# Patient Record
Sex: MALE | Race: WHITE | Employment: FULL TIME | URBAN - METROPOLITAN AREA
[De-identification: names, ages, dates, MRNs, and addresses within clinical notes are randomized per-mention and may not be internally consistent; named-entity substitution may affect disease eponyms.]

---

## 2019-06-06 ENCOUNTER — ANESTHESIA EVENT (OUTPATIENT)
Dept: SURGERY | Age: 54
End: 2019-06-06
Payer: OTHER MISCELLANEOUS

## 2019-06-07 ENCOUNTER — ANESTHESIA (OUTPATIENT)
Dept: SURGERY | Age: 54
End: 2019-06-07
Payer: OTHER MISCELLANEOUS

## 2019-06-07 ENCOUNTER — HOSPITAL ENCOUNTER (OUTPATIENT)
Age: 54
Setting detail: OUTPATIENT SURGERY
Discharge: HOME | End: 2019-06-07
Attending: ORTHOPAEDIC SURGERY | Admitting: ORTHOPAEDIC SURGERY
Payer: OTHER MISCELLANEOUS

## 2019-06-07 VITALS
BODY MASS INDEX: 32.02 KG/M2 | OXYGEN SATURATION: 96 % | WEIGHT: 270 LBS | SYSTOLIC BLOOD PRESSURE: 145 MMHG | TEMPERATURE: 97.8 F | HEART RATE: 59 BPM | DIASTOLIC BLOOD PRESSURE: 75 MMHG | RESPIRATION RATE: 15 BRPM

## 2019-06-07 DIAGNOSIS — L76.82 PAIN AT SURGICAL INCISION: Primary | ICD-10-CM

## 2019-06-07 LAB — POTASSIUM BLD-SCNC: 3.5 MMOL/L (ref 3.5–5.1)

## 2019-06-07 PROCEDURE — 76060000032 HC ANESTHESIA 0.5 TO 1 HR: Performed by: ORTHOPAEDIC SURGERY

## 2019-06-07 PROCEDURE — 76210000020 HC REC RM PH II FIRST 0.5 HR: Performed by: ORTHOPAEDIC SURGERY

## 2019-06-07 PROCEDURE — 77030018836 HC SOL IRR NACL ICUM -A: Performed by: ORTHOPAEDIC SURGERY

## 2019-06-07 PROCEDURE — 77030022036 HC BLD SHV TOMCAT STRY -B: Performed by: ORTHOPAEDIC SURGERY

## 2019-06-07 PROCEDURE — 77030003666 HC NDL SPINAL BD -A: Performed by: ORTHOPAEDIC SURGERY

## 2019-06-07 PROCEDURE — 77030010509 HC AIRWY LMA MSK TELE -A: Performed by: ANESTHESIOLOGY

## 2019-06-07 PROCEDURE — 74011250636 HC RX REV CODE- 250/636

## 2019-06-07 PROCEDURE — 76010000138 HC OR TIME 0.5 TO 1 HR: Performed by: ORTHOPAEDIC SURGERY

## 2019-06-07 PROCEDURE — 77030000032 HC CUF TRNQT ZIMM -B: Performed by: ORTHOPAEDIC SURGERY

## 2019-06-07 PROCEDURE — 74011250636 HC RX REV CODE- 250/636: Performed by: ANESTHESIOLOGY

## 2019-06-07 PROCEDURE — 77030038012 HC WND COBLATN S&N -F: Performed by: ORTHOPAEDIC SURGERY

## 2019-06-07 PROCEDURE — 84132 ASSAY OF SERUM POTASSIUM: CPT

## 2019-06-07 PROCEDURE — 76210000006 HC OR PH I REC 0.5 TO 1 HR: Performed by: ORTHOPAEDIC SURGERY

## 2019-06-07 PROCEDURE — 74011250636 HC RX REV CODE- 250/636: Performed by: ORTHOPAEDIC SURGERY

## 2019-06-07 PROCEDURE — 74011250637 HC RX REV CODE- 250/637: Performed by: ANESTHESIOLOGY

## 2019-06-07 RX ORDER — FENTANYL CITRATE 50 UG/ML
INJECTION, SOLUTION INTRAMUSCULAR; INTRAVENOUS AS NEEDED
Status: DISCONTINUED | OUTPATIENT
Start: 2019-06-07 | End: 2019-06-07 | Stop reason: HOSPADM

## 2019-06-07 RX ORDER — HYDROMORPHONE HYDROCHLORIDE 2 MG/ML
0.5 INJECTION, SOLUTION INTRAMUSCULAR; INTRAVENOUS; SUBCUTANEOUS
Status: DISCONTINUED | OUTPATIENT
Start: 2019-06-07 | End: 2019-06-07 | Stop reason: HOSPADM

## 2019-06-07 RX ORDER — SODIUM CHLORIDE, SODIUM LACTATE, POTASSIUM CHLORIDE, CALCIUM CHLORIDE 600; 310; 30; 20 MG/100ML; MG/100ML; MG/100ML; MG/100ML
75 INJECTION, SOLUTION INTRAVENOUS CONTINUOUS
Status: DISCONTINUED | OUTPATIENT
Start: 2019-06-07 | End: 2019-06-07 | Stop reason: HOSPADM

## 2019-06-07 RX ORDER — LIDOCAINE HYDROCHLORIDE 10 MG/ML
0.1 INJECTION INFILTRATION; PERINEURAL AS NEEDED
Status: DISCONTINUED | OUTPATIENT
Start: 2019-06-07 | End: 2019-06-07 | Stop reason: HOSPADM

## 2019-06-07 RX ORDER — DEXAMETHASONE SODIUM PHOSPHATE 100 MG/10ML
INJECTION INTRAMUSCULAR; INTRAVENOUS AS NEEDED
Status: DISCONTINUED | OUTPATIENT
Start: 2019-06-07 | End: 2019-06-07 | Stop reason: HOSPADM

## 2019-06-07 RX ORDER — KETOROLAC TROMETHAMINE 30 MG/ML
30 INJECTION, SOLUTION INTRAMUSCULAR; INTRAVENOUS ONCE
Status: COMPLETED | OUTPATIENT
Start: 2019-06-07 | End: 2019-06-07

## 2019-06-07 RX ORDER — ACETAMINOPHEN 10 MG/ML
1000 INJECTION, SOLUTION INTRAVENOUS ONCE
Status: COMPLETED | OUTPATIENT
Start: 2019-06-07 | End: 2019-06-07

## 2019-06-07 RX ORDER — LIDOCAINE HYDROCHLORIDE 20 MG/ML
INJECTION, SOLUTION EPIDURAL; INFILTRATION; INTRACAUDAL; PERINEURAL AS NEEDED
Status: DISCONTINUED | OUTPATIENT
Start: 2019-06-07 | End: 2019-06-07 | Stop reason: HOSPADM

## 2019-06-07 RX ORDER — HYDROCODONE BITARTRATE AND ACETAMINOPHEN 5; 325 MG/1; MG/1
1 TABLET ORAL
Qty: 20 TAB | Refills: 0 | Status: SHIPPED | OUTPATIENT
Start: 2019-06-07 | End: 2019-06-10

## 2019-06-07 RX ORDER — SODIUM CHLORIDE 0.9 % (FLUSH) 0.9 %
5-40 SYRINGE (ML) INJECTION EVERY 8 HOURS
Status: DISCONTINUED | OUTPATIENT
Start: 2019-06-07 | End: 2019-06-07 | Stop reason: HOSPADM

## 2019-06-07 RX ORDER — OXYCODONE AND ACETAMINOPHEN 5; 325 MG/1; MG/1
1 TABLET ORAL AS NEEDED
Status: DISCONTINUED | OUTPATIENT
Start: 2019-06-07 | End: 2019-06-07 | Stop reason: HOSPADM

## 2019-06-07 RX ORDER — NALOXONE HYDROCHLORIDE 0.4 MG/ML
0.2 INJECTION, SOLUTION INTRAMUSCULAR; INTRAVENOUS; SUBCUTANEOUS AS NEEDED
Status: DISCONTINUED | OUTPATIENT
Start: 2019-06-07 | End: 2019-06-07 | Stop reason: HOSPADM

## 2019-06-07 RX ORDER — ROPIVACAINE HYDROCHLORIDE 5 MG/ML
INJECTION, SOLUTION EPIDURAL; INFILTRATION; PERINEURAL AS NEEDED
Status: DISCONTINUED | OUTPATIENT
Start: 2019-06-07 | End: 2019-06-07 | Stop reason: HOSPADM

## 2019-06-07 RX ORDER — PROPOFOL 10 MG/ML
INJECTION, EMULSION INTRAVENOUS AS NEEDED
Status: DISCONTINUED | OUTPATIENT
Start: 2019-06-07 | End: 2019-06-07 | Stop reason: HOSPADM

## 2019-06-07 RX ORDER — MIDAZOLAM HYDROCHLORIDE 1 MG/ML
2 INJECTION, SOLUTION INTRAMUSCULAR; INTRAVENOUS
Status: DISCONTINUED | OUTPATIENT
Start: 2019-06-07 | End: 2019-06-07 | Stop reason: HOSPADM

## 2019-06-07 RX ORDER — SODIUM CHLORIDE 0.9 % (FLUSH) 0.9 %
5-40 SYRINGE (ML) INJECTION AS NEEDED
Status: DISCONTINUED | OUTPATIENT
Start: 2019-06-07 | End: 2019-06-07 | Stop reason: HOSPADM

## 2019-06-07 RX ADMIN — PROPOFOL 200 MG: 10 INJECTION, EMULSION INTRAVENOUS at 09:56

## 2019-06-07 RX ADMIN — PROPOFOL 40 MG: 10 INJECTION, EMULSION INTRAVENOUS at 10:26

## 2019-06-07 RX ADMIN — DEXAMETHASONE SODIUM PHOSPHATE 10 MG: 100 INJECTION INTRAMUSCULAR; INTRAVENOUS at 10:24

## 2019-06-07 RX ADMIN — KETOROLAC TROMETHAMINE 30 MG: 30 INJECTION, SOLUTION INTRAMUSCULAR at 11:00

## 2019-06-07 RX ADMIN — SODIUM CHLORIDE, SODIUM LACTATE, POTASSIUM CHLORIDE, AND CALCIUM CHLORIDE 75 ML/HR: 600; 310; 30; 20 INJECTION, SOLUTION INTRAVENOUS at 09:06

## 2019-06-07 RX ADMIN — HYDROMORPHONE HYDROCHLORIDE 0.5 MG: 2 INJECTION INTRAMUSCULAR; INTRAVENOUS; SUBCUTANEOUS at 10:52

## 2019-06-07 RX ADMIN — FENTANYL CITRATE 100 MCG: 50 INJECTION, SOLUTION INTRAMUSCULAR; INTRAVENOUS at 09:56

## 2019-06-07 RX ADMIN — PROPOFOL 100 MG: 10 INJECTION, EMULSION INTRAVENOUS at 09:57

## 2019-06-07 RX ADMIN — HYDROMORPHONE HYDROCHLORIDE 0.5 MG: 2 INJECTION INTRAMUSCULAR; INTRAVENOUS; SUBCUTANEOUS at 10:57

## 2019-06-07 RX ADMIN — OXYCODONE HYDROCHLORIDE AND ACETAMINOPHEN 1 TABLET: 5; 325 TABLET ORAL at 11:14

## 2019-06-07 RX ADMIN — HYDROMORPHONE HYDROCHLORIDE 0.5 MG: 2 INJECTION INTRAMUSCULAR; INTRAVENOUS; SUBCUTANEOUS at 11:05

## 2019-06-07 RX ADMIN — LIDOCAINE HYDROCHLORIDE 90 MG: 20 INJECTION, SOLUTION EPIDURAL; INFILTRATION; INTRACAUDAL; PERINEURAL at 09:56

## 2019-06-07 RX ADMIN — ACETAMINOPHEN 1000 MG: 10 INJECTION, SOLUTION INTRAVENOUS at 11:01

## 2019-06-07 NOTE — BRIEF OP NOTE
BRIEF OPERATIVE NOTE    Date of Procedure: 6/7/2019   Preoperative Diagnosis: Complex tear of medial meniscus of left knee as current injury, initial encounter [S83.232A]  Postoperative Diagnosis: Complex tear of medial meniscus of left knee as current injury, initial encounter [S83.232A]    Procedure(s):  KNEE ARTHROSCOPY MEDIAL MENISCECTOMY/ LEFT  Surgeon(s) and Role:     * Whit Pollock MD - Primary         Surgical Assistant:     Surgical Staff:  Circ-1: Garett Martínez RN  Scrub Tech-1: Tamara Reis  Event Time In Time Out   Incision Start 1006    Incision Close 1028      Anesthesia: General   Estimated Blood Loss:   Specimens: * No specimens in log *   Findings:    Complications:   Implants: * No implants in log *

## 2019-06-07 NOTE — DISCHARGE INSTRUCTIONS
Post-Operative Instructions   For  Hermann Frey  Knee Arthroscopy  Phone:  [phone]    1. Unless otherwise instructed, you may place as much weight on your leg as you wish. 2. For the first 48-72 hours, following surgery, use ice on the knee every two hours (while awake) for 20-30 minutes at a time to help prevent swelling and lessen pain. Elevate the leg. 3. Perform at least 30 to 50 leg-raising exercises twice a day. These are in the arthroscopy booklet that you received. Begin exercise as soon as pain allows. Also perform gentle active motion of the knee as soon as pain allows. 4. On the second  day after surgery, remove the dressing. Leave steri-strips on, they eventually will peel off. Use waterproof band aids to cover when you shower. 5. Use  pain medication as instructed on the bottle. If you already have pain medication from another physician do not use it with this medication. You can also take ibuprofen for pain with this medication if you are not already on an arthritis type medication. 6. You may have some side effects from your pain medication. If you have nausea, try taking your medication with food. For itching, you may take over the counter Benadryl. 7. You may shower after two days. Remove the dressing and use waterproof band aids from the drugstore. 8. If you have a problem, please call [facility] at [phone]    Briana Velazquez M.D. [facility]     TYPICAL SIDE EFFECTS OF PAIN MEDICATION:  *    Constipation: Drink lots of fluids. Over the counter stool softener if needed. *    Nausea: Take pain medication with food. Call your doctor with persistent nausea. ACTIVITY  · As tolerated and as directed by your doctor. · Bathe or shower as directed by your doctor.      DIET  · Day of surgery: Clear liquids until no nausea or vomiting; small portion, light diet [place] foods (ex: baked chicken, plain rice, grits, scrambled eggs, toast). Nothing greasy, fried or spicy today. · Advance to regular diet on second day, unless your doctor orders otherwise. · If nausea and vomiting continues, call your doctor. PAIN  · Take pain medication as directed by your doctor. · DO NOT take aspirin or blood thinners unless directed by your doctor. CALL YOUR DOCTOR IF    s Call your doctor if pain is NOT relieved by medication.   s Excessive bleeding that does not stop after holding pressure over the area  · Temperature of 101 degrees F or above  · Excessive redness, swelling or bruising, and/ or green or yellow, smelly discharge from incision    AFTER ANESTHESIA   · For the first 24 hours: DO NOT Drive, Drink alcoholic beverages, or Make important decisions. · Be aware of dizziness following anesthesia and while taking pain medication. DISCHARGE SUMMARY from Nurse    PATIENT INSTRUCTIONS:    After general anesthesia or intravenous sedation, for 24 hours or while taking prescription Narcotics:  · Limit your activities  · Do not drive and operate hazardous machinery  · Do not make important personal or business decisions  · Do  not drink alcoholic beverages  · If you have not urinated within 8 hours after discharge, please contact your surgeon on call. *  Please give a list of your current medications to your Primary Care Provider. *  Please update this list whenever your medications are discontinued, doses are      changed, or new medications (including over-the-counter products) are added. *  Please carry medication information at all times in case of emergency situations. Preventing Infection at Home  We care about preventing infection and avoiding the spread of germs - not only when you are in the hospital but also when you return home.  When you return home from the hospital, its important to take the following steps to help prevent infection and avoid spreading germs that could infect you and others. Ask everyone in your home to follow these guidelines, too. Clean Your Hands  · Clean your hands whenever your hands are visibly dirty, before you eat, before or after touching your mouth, nose or eyes, and before preparing food. Clean them after contact with body fluids, using the restroom, touching animals or changing diapers. · When washing hands, wet them with warm water and work up a lather. Rub hands for at least 15 seconds, then rinse them and pat them dry with a clean towel or paper towel. · When using hand sanitizers, it should take about 15 seconds to rub your hands dry. If not, you probably didnt apply enough . Cover Your Sneeze or Cough  Germs are released into the air whenever you sneeze or cough. To prevent the spread of infection:  · Turn away from other people before coughing or sneezing. · Cover your mouth or nose with a tissue when you cough or sneeze. Put the tissue in the trash. · If you dont have a tissue, cough or sneeze into your upper sleeve, not your hands. · Always clean your hands after coughing or sneezing. Care for Wounds  Your skin is your bodys first line of defense against germs, but an open wound leaves an easy way for germs to enter your body. To prevent infection:  · Clean your hands before and after changing wound dressings, and wear gloves to change dressings if recommended by your doctor. · Take special care with IV lines or other devices inserted into the body. If you must touch them, clean your hands first.  · Follow any specific instructions from your doctor to care for your wounds. Contact your doctor if you experience any signs of infection, such as fever or increased redness at the surgical or wound site. Keep a Clean Home  · Clean or wipe commonly touched hard surfaces like door handles, sinks, tabletops, phones and TV remotes. · Use products labeled disinfectant to kill harmful bacteria and viruses.   · Use a clean cloth or paper towel to clean and dry surfaces. Wiping surfaces with a dirty dishcloth, sponge or towel will only spread germs. · Never share toothbrushes, rodriges, drinking glasses, utensils, razor blades, face cloths or bath towels to avoid spreading germs. · Be sure that the linens that you sleep on are clean. · Keep pets away from wounds and wash your hands after touching pets, their toys or bedding. We care about you and your health. Remember, preventing infections is a team effort between you, your family, friends and health care providers. These are general instructions for a healthy lifestyle:    No smoking/ No tobacco products/ Avoid exposure to second hand smoke    Surgeon General's Warning:  Quitting smoking now greatly reduces serious risk to your health. Obesity, smoking, and sedentary lifestyle greatly increases your risk for illness    A healthy diet, regular physical exercise & weight monitoring are important for maintaining a healthy lifestyle    You may be retaining fluid if you have a history of heart failure or if you experience any of the following symptoms:  Weight gain of 3 pounds or more overnight or 5 pounds in a week, increased swelling in our hands or feet or shortness of breath while lying flat in bed. Please call your doctor as soon as you notice any of these symptoms; do not wait until your next office visit. Recognize signs and symptoms of STROKE:    F-face looks uneven    A-arms unable to move or move unevenly    S-speech slurred or non-existent    T-time-call 911 as soon as signs and symptoms begin-DO NOT go       Back to bed or wait to see if you get better-TIME IS BRAIN.

## 2019-06-07 NOTE — ANESTHESIA POSTPROCEDURE EVALUATION
Procedure(s):  KNEE ARTHROSCOPY MEDIAL MENISCECTOMY/ LEFT. No value filed. Anesthesia Post Evaluation      Multimodal analgesia: multimodal analgesia used between 6 hours prior to anesthesia start to PACU discharge  Patient location during evaluation: PACU  Patient participation: complete - patient participated  Level of consciousness: awake and alert  Pain management: adequate  Airway patency: patent  Anesthetic complications: no  Cardiovascular status: acceptable  Respiratory status: acceptable  Hydration status: acceptable  Post anesthesia nausea and vomiting:  none      Vitals Value Taken Time   /72 6/7/2019 11:30 AM   Temp 36.6 °C (97.8 °F) 6/7/2019 11:30 AM   Pulse 58 6/7/2019 11:30 AM   Resp 15 6/7/2019 11:30 AM   SpO2 98 % 6/7/2019 11:30 AM   Vitals shown include unvalidated device data.

## 2019-06-07 NOTE — OP NOTES
PATIENT:   Hermann Frey      DATE OF SURGERY:  6/7/2019      PREOPERATIVE  DIAGNOSIS:  Complex tear of medial meniscus of left knee as current injury, initial encounter [S83.381A]      POSTOPERATIVE DIAGNOSIS:   Complex tear of medial meniscus of left knee as current injury, initial encounter [S83.241A]      PROCEDURE:   Procedure(s):  KNEE ARTHROSCOPY MEDIAL MENISCECTOMY/ LEFT      PROCEDURE IN DETAIL:   The patient's left   knee was prepped and draped in a sterile fashion. The arthroscope was inserted through an anterolateral  portal and the interior of the joint was examined. The patellofemoral compartment showed grade 2  chondromalacia of the patella. The medial compartment was examined. There was a tear of the medial meniscus involving the posterior horn. It was a complex tear. The articular surfaces were noted to be normal.  The torn portion of the meniscus was removed using the Acufex duckling biters  and the   intraarticular shaver. The [facility] ablator was used as well. This left a stable rim of meniscus tissue. It was probed and there was no unstable meniscal tissue left. The ACL and the PCL were noted to be intact. The lateral compartment was examined next. The lateral meniscus was not torn. The lateral articular surfaces were intact. The knee was then irrigated. A sterile dressing was applied. The patient was then taken to the recovery room in satisfactory condition.           Alona Nuno M.D.

## 2019-06-07 NOTE — PERIOP NOTES
PACU DISCHARGE NOTE  Vital signs stable, pain well controlled, alert and oriented times three or at baseline, no anesthetic complications. IV removed with catheter tip intact. Written and verbal discharge instructions given, including pain control, dressing care and follow up appointment. Spouse, Tammy Hong verbalized understanding and signed discharge instructions electronically. All questions answered prior to discharge. Dr Sydni Bond okay to discharge at this time. Pt and all belongings taken via wheelchair and safely put in vehicle.

## 2019-06-07 NOTE — H&P
Outpatient Surgery History and Physical:  Hermann Dunbar was seen and examined. CHIEF COMPLAINT:   Left knee. PE:     Visit Vitals  /81 (BP 1 Location: Left arm, BP Patient Position: Sitting)   Pulse 60   Temp 97.5 °F (36.4 °C)   Resp 18   Wt 122.5 kg (270 lb)   SpO2 96%   BMI 32.02 kg/m²       Heart:   Regular rhythm      Lungs:  Are clear      Past Medical History: There are no active problems to display for this patient. Surgical History:   Past Surgical History:   Procedure Laterality Date    HX APPENDECTOMY      HX KNEE ARTHROSCOPY      HX ORTHOPAEDIC Right     elbow    HX OTHER SURGICAL      lt facial plates, Jaw    HX SHOULDER ARTHROSCOPY Right        Social History: Patient  reports that he has never smoked. He has never used smokeless tobacco. He reports that he drinks alcohol. He reports that he does not use drugs. Family History:   Family History   Problem Relation Age of Onset    Heart Disease Mother     Cancer Mother     Heart Disease Father     Hypertension Father        Allergies: Reviewed per EMR  No Known Allergies    Medications:    No current facility-administered medications on file prior to encounter. No current outpatient medications on file prior to encounter. The surgery is planned for the left knee. The patient is here today for outpatient surgery. I have examined the patient, no changes are noted in the patient's medical status. Necessity for the procedure/care is still present and the history and physical above is current. See the office notes for the full long term history of the problem. Please see the recent office notes for the musculoskeletal examination.     Signed By: Gonzalez Skelton MD     June 7, 2019 9:37 AM

## 2019-06-07 NOTE — ANESTHESIA PREPROCEDURE EVALUATION
Relevant Problems   No relevant active problems       Anesthetic History   No history of anesthetic complications            Review of Systems / Medical History  Patient summary reviewed, nursing notes reviewed and pertinent labs reviewed    Pulmonary  Within defined limits                 Neuro/Psych     seizures        Comments: Had seizures in distant past in conjunction with migraines, no recent issues Cardiovascular    Hypertension              Exercise tolerance: >4 METS     GI/Hepatic/Renal     GERD: well controlled           Endo/Other  Within defined limits           Other Findings              Physical Exam    Airway  Mallampati: II  TM Distance: 4 - 6 cm  Neck ROM: normal range of motion   Mouth opening: Normal     Cardiovascular    Rhythm: regular           Dental  No notable dental hx       Pulmonary  Breath sounds clear to auscultation               Abdominal  GI exam deferred       Other Findings            Anesthetic Plan    ASA: 2              Anesthetic plan and risks discussed with: Patient and Spouse

## 2019-07-17 ENCOUNTER — HOSPITAL ENCOUNTER (OUTPATIENT)
Dept: ULTRASOUND IMAGING | Age: 54
Discharge: HOME | End: 2019-07-17
Attending: ORTHOPAEDIC SURGERY
Payer: OTHER MISCELLANEOUS

## 2019-07-17 DIAGNOSIS — M79.89 SWELLING OF LEFT LOWER EXTREMITY: ICD-10-CM

## 2019-07-17 PROCEDURE — 93971 EXTREMITY STUDY: CPT

## 2020-01-23 ENCOUNTER — ANESTHESIA EVENT (OUTPATIENT)
Dept: SURGERY | Age: 55
End: 2020-01-23
Payer: OTHER MISCELLANEOUS

## 2020-01-23 RX ORDER — NALOXONE HYDROCHLORIDE 0.4 MG/ML
0.2 INJECTION, SOLUTION INTRAMUSCULAR; INTRAVENOUS; SUBCUTANEOUS AS NEEDED
Status: CANCELLED | OUTPATIENT
Start: 2020-01-23

## 2020-01-23 RX ORDER — OXYCODONE AND ACETAMINOPHEN 5; 325 MG/1; MG/1
1 TABLET ORAL AS NEEDED
Status: CANCELLED | OUTPATIENT
Start: 2020-01-23

## 2020-01-23 RX ORDER — SODIUM CHLORIDE 0.9 % (FLUSH) 0.9 %
5-40 SYRINGE (ML) INJECTION AS NEEDED
Status: CANCELLED | OUTPATIENT
Start: 2020-01-23

## 2020-01-23 RX ORDER — SODIUM CHLORIDE 0.9 % (FLUSH) 0.9 %
5-40 SYRINGE (ML) INJECTION EVERY 8 HOURS
Status: CANCELLED | OUTPATIENT
Start: 2020-01-23

## 2020-01-23 RX ORDER — HYDROMORPHONE HYDROCHLORIDE 2 MG/ML
0.5 INJECTION, SOLUTION INTRAMUSCULAR; INTRAVENOUS; SUBCUTANEOUS
Status: CANCELLED | OUTPATIENT
Start: 2020-01-23

## 2020-01-23 RX ORDER — SODIUM CHLORIDE, SODIUM LACTATE, POTASSIUM CHLORIDE, CALCIUM CHLORIDE 600; 310; 30; 20 MG/100ML; MG/100ML; MG/100ML; MG/100ML
75 INJECTION, SOLUTION INTRAVENOUS CONTINUOUS
Status: CANCELLED | OUTPATIENT
Start: 2020-01-23

## 2020-01-24 ENCOUNTER — HOSPITAL ENCOUNTER (OUTPATIENT)
Age: 55
Setting detail: OUTPATIENT SURGERY
Discharge: HOME | End: 2020-01-24
Attending: ORTHOPAEDIC SURGERY | Admitting: ORTHOPAEDIC SURGERY
Payer: OTHER MISCELLANEOUS

## 2020-01-24 ENCOUNTER — ANESTHESIA (OUTPATIENT)
Dept: SURGERY | Age: 55
End: 2020-01-24
Payer: OTHER MISCELLANEOUS

## 2020-01-24 VITALS
HEIGHT: 75 IN | TEMPERATURE: 97.7 F | DIASTOLIC BLOOD PRESSURE: 84 MMHG | HEART RATE: 63 BPM | RESPIRATION RATE: 14 BRPM | WEIGHT: 280 LBS | BODY MASS INDEX: 34.82 KG/M2 | SYSTOLIC BLOOD PRESSURE: 137 MMHG | OXYGEN SATURATION: 96 %

## 2020-01-24 DIAGNOSIS — L76.82 PAIN AT SURGICAL INCISION: Primary | ICD-10-CM

## 2020-01-24 LAB — POTASSIUM BLD-SCNC: 3.5 MMOL/L (ref 3.5–5.1)

## 2020-01-24 PROCEDURE — 74011250636 HC RX REV CODE- 250/636: Performed by: ANESTHESIOLOGY

## 2020-01-24 PROCEDURE — 77030003666 HC NDL SPINAL BD -A: Performed by: ORTHOPAEDIC SURGERY

## 2020-01-24 PROCEDURE — 77030000032 HC CUF TRNQT ZIMM -B: Performed by: ORTHOPAEDIC SURGERY

## 2020-01-24 PROCEDURE — 74011250636 HC RX REV CODE- 250/636: Performed by: NURSE ANESTHETIST, CERTIFIED REGISTERED

## 2020-01-24 PROCEDURE — 74011000250 HC RX REV CODE- 250: Performed by: NURSE ANESTHETIST, CERTIFIED REGISTERED

## 2020-01-24 PROCEDURE — 77030038012 HC WND COBLATN S&N -F: Performed by: ORTHOPAEDIC SURGERY

## 2020-01-24 PROCEDURE — 77030010509 HC AIRWY LMA MSK TELE -A: Performed by: ANESTHESIOLOGY

## 2020-01-24 PROCEDURE — 77030018836 HC SOL IRR NACL ICUM -A: Performed by: ORTHOPAEDIC SURGERY

## 2020-01-24 PROCEDURE — 77030022036 HC BLD SHV TOMCAT STRY -B: Performed by: ORTHOPAEDIC SURGERY

## 2020-01-24 PROCEDURE — 84132 ASSAY OF SERUM POTASSIUM: CPT

## 2020-01-24 PROCEDURE — 76210000020 HC REC RM PH II FIRST 0.5 HR: Performed by: ORTHOPAEDIC SURGERY

## 2020-01-24 PROCEDURE — 76210000006 HC OR PH I REC 0.5 TO 1 HR: Performed by: ORTHOPAEDIC SURGERY

## 2020-01-24 PROCEDURE — 76010000138 HC OR TIME 0.5 TO 1 HR: Performed by: ORTHOPAEDIC SURGERY

## 2020-01-24 PROCEDURE — 74011250636 HC RX REV CODE- 250/636: Performed by: ORTHOPAEDIC SURGERY

## 2020-01-24 PROCEDURE — 76060000032 HC ANESTHESIA 0.5 TO 1 HR: Performed by: ORTHOPAEDIC SURGERY

## 2020-01-24 RX ORDER — PROPOFOL 10 MG/ML
INJECTION, EMULSION INTRAVENOUS AS NEEDED
Status: DISCONTINUED | OUTPATIENT
Start: 2020-01-24 | End: 2020-01-24 | Stop reason: HOSPADM

## 2020-01-24 RX ORDER — LIDOCAINE HYDROCHLORIDE 10 MG/ML
0.1 INJECTION INFILTRATION; PERINEURAL AS NEEDED
Status: DISCONTINUED | OUTPATIENT
Start: 2020-01-24 | End: 2020-01-24 | Stop reason: HOSPADM

## 2020-01-24 RX ORDER — ONDANSETRON 2 MG/ML
INJECTION INTRAMUSCULAR; INTRAVENOUS AS NEEDED
Status: DISCONTINUED | OUTPATIENT
Start: 2020-01-24 | End: 2020-01-24 | Stop reason: HOSPADM

## 2020-01-24 RX ORDER — OXYCODONE HYDROCHLORIDE 5 MG/1
5 TABLET ORAL
Qty: 15 TAB | Refills: 0 | Status: SHIPPED | OUTPATIENT
Start: 2020-01-24 | End: 2020-01-27

## 2020-01-24 RX ORDER — ROPIVACAINE HYDROCHLORIDE 5 MG/ML
INJECTION, SOLUTION EPIDURAL; INFILTRATION; PERINEURAL AS NEEDED
Status: DISCONTINUED | OUTPATIENT
Start: 2020-01-24 | End: 2020-01-24 | Stop reason: HOSPADM

## 2020-01-24 RX ORDER — EPHEDRINE SULFATE/0.9% NACL/PF 50 MG/5 ML
SYRINGE (ML) INTRAVENOUS AS NEEDED
Status: DISCONTINUED | OUTPATIENT
Start: 2020-01-24 | End: 2020-01-24 | Stop reason: HOSPADM

## 2020-01-24 RX ORDER — SODIUM CHLORIDE, SODIUM LACTATE, POTASSIUM CHLORIDE, CALCIUM CHLORIDE 600; 310; 30; 20 MG/100ML; MG/100ML; MG/100ML; MG/100ML
75 INJECTION, SOLUTION INTRAVENOUS CONTINUOUS
Status: DISCONTINUED | OUTPATIENT
Start: 2020-01-24 | End: 2020-01-24 | Stop reason: HOSPADM

## 2020-01-24 RX ORDER — DEXAMETHASONE SODIUM PHOSPHATE 4 MG/ML
INJECTION, SOLUTION INTRA-ARTICULAR; INTRALESIONAL; INTRAMUSCULAR; INTRAVENOUS; SOFT TISSUE AS NEEDED
Status: DISCONTINUED | OUTPATIENT
Start: 2020-01-24 | End: 2020-01-24 | Stop reason: HOSPADM

## 2020-01-24 RX ORDER — FENTANYL CITRATE 50 UG/ML
INJECTION, SOLUTION INTRAMUSCULAR; INTRAVENOUS AS NEEDED
Status: DISCONTINUED | OUTPATIENT
Start: 2020-01-24 | End: 2020-01-24 | Stop reason: HOSPADM

## 2020-01-24 RX ORDER — LIDOCAINE HYDROCHLORIDE 20 MG/ML
INJECTION, SOLUTION EPIDURAL; INFILTRATION; INTRACAUDAL; PERINEURAL AS NEEDED
Status: DISCONTINUED | OUTPATIENT
Start: 2020-01-24 | End: 2020-01-24 | Stop reason: HOSPADM

## 2020-01-24 RX ORDER — MIDAZOLAM HYDROCHLORIDE 1 MG/ML
2 INJECTION, SOLUTION INTRAMUSCULAR; INTRAVENOUS
Status: DISCONTINUED | OUTPATIENT
Start: 2020-01-24 | End: 2020-01-24 | Stop reason: HOSPADM

## 2020-01-24 RX ADMIN — ONDANSETRON 4 MG: 2 INJECTION INTRAMUSCULAR; INTRAVENOUS at 08:18

## 2020-01-24 RX ADMIN — DEXAMETHASONE SODIUM PHOSPHATE 4 MG: 4 INJECTION, SOLUTION INTRAMUSCULAR; INTRAVENOUS at 08:16

## 2020-01-24 RX ADMIN — LIDOCAINE HYDROCHLORIDE 80 MG: 20 INJECTION, SOLUTION EPIDURAL; INFILTRATION; INTRACAUDAL; PERINEURAL at 08:10

## 2020-01-24 RX ADMIN — FENTANYL CITRATE 50 MCG: 50 INJECTION INTRAMUSCULAR; INTRAVENOUS at 08:23

## 2020-01-24 RX ADMIN — SODIUM CHLORIDE, SODIUM LACTATE, POTASSIUM CHLORIDE, AND CALCIUM CHLORIDE 75 ML/HR: 600; 310; 30; 20 INJECTION, SOLUTION INTRAVENOUS at 06:58

## 2020-01-24 RX ADMIN — PROPOFOL 300 MG: 10 INJECTION, EMULSION INTRAVENOUS at 08:10

## 2020-01-24 RX ADMIN — FENTANYL CITRATE 50 MCG: 50 INJECTION INTRAMUSCULAR; INTRAVENOUS at 08:10

## 2020-01-24 RX ADMIN — Medication 10 MG: at 08:14

## 2020-01-24 NOTE — DISCHARGE INSTRUCTIONS
Post-Operative Instructions   For  Hermann Frey  Knee Arthroscopy  Phone:  [phone]    1. Unless otherwise instructed, you may place as much weight on your leg as you wish. 2. For the first 48-72 hours, following surgery, use ice on the knee every two hours (while awake) for 20-30 minutes at a time to help prevent swelling and lessen pain. Elevate the leg. 3. Perform at least 30 to 50 leg-raising exercises twice a day. These are in the arthroscopy booklet that you received. Begin exercise as soon as pain allows. Also perform gentle active motion of the knee as soon as pain allows. 4. On the second  day after surgery, remove the dressing. Leave steri-strips on, they eventually will peel off. Use waterproof band aids to cover when you shower. 5. Use  pain medication as instructed on the bottle. If you already have pain medication from another physician do not use it with this medication. You can also take ibuprofen for pain with this medication if you are not already on an arthritis type medication. 6. You may have some side effects from your pain medication. If you have nausea, try taking your medication with food. For itching, you may take over the counter Benadryl. 7. You may shower after two days. Remove the dressing and use waterproof band aids from the drugstore. 8. If you have a problem, please call [address] [place] [address] at [phone]    Jennifer Ferreira M.D. [address] [place] [address], P.A. ACTIVITY  · As tolerated and as directed by your doctor. · Bathe or shower as directed by your doctor. DIET  · Clear liquids until no nausea or vomiting; then light diet for the first day. · Advance to regular diet on second day, unless your doctor orders otherwise. · If nausea and vomiting continues, call your doctor. PAIN  · Take pain medication as directed by your doctor. · Call your doctor if pain is NOT relieved by medication.    · DO NOT take aspirin of blood thinners unless directed by your doctor. DRESSING CARE       CALL YOUR DOCTOR IF   · Excessive bleeding that does not stop after holding pressure over the area  · Temperature of 101 degrees F or above  · Excessive redness, swelling or bruising, and/ or green or yellow, smelly discharge from incision    AFTER ANESTHESIA   · For the first 24 hours: DO NOT Drive, Drink alcoholic beverages, or Make important decisions. · Be aware of dizziness following anesthesia and while taking pain medication. APPOINTMENT DATE/ TIME    YOUR DOCTOR'S PHONE NUMBER       DISCHARGE SUMMARY from Nurse    PATIENT INSTRUCTIONS:    After general anesthesia or intravenous sedation, for 24 hours or while taking prescription Narcotics:  · Limit your activities  · Do not drive and operate hazardous machinery  · Do not make important personal or business decisions  · Do  not drink alcoholic beverages  · If you have not urinated within 8 hours after discharge, please contact your surgeon on call. *  Please give a list of your current medications to your Primary Care Provider. *  Please update this list whenever your medications are discontinued, doses are      changed, or new medications (including over-the-counter products) are added. *  Please carry medication information at all times in case of emergency situations. These are general instructions for a healthy lifestyle:    No smoking/ No tobacco products/ Avoid exposure to second hand smoke    Surgeon General's Warning:  Quitting smoking now greatly reduces serious risk to your health.     Obesity, smoking, and sedentary lifestyle greatly increases your risk for illness    A healthy diet, regular physical exercise & weight monitoring are important for maintaining a healthy lifestyle    You may be retaining fluid if you have a history of heart failure or if you experience any of the following symptoms:  Weight gain of 3 pounds or more overnight or 5 pounds in a week, increased swelling in our hands or feet or shortness of breath while lying flat in bed. Please call your doctor as soon as you notice any of these symptoms; do not wait until your next office visit. Recognize signs and symptoms of STROKE:    F-face looks uneven    A-arms unable to move or move unevenly    S-speech slurred or non-existent    T-time-call 911 as soon as signs and symptoms begin-DO NOT go       Back to bed or wait to see if you get better-TIME IS BRAIN.

## 2020-01-24 NOTE — H&P
Outpatient Surgery History and Physical:  Hermann David was seen and examined. CHIEF COMPLAINT:   Left knee MM. PE:     Visit Vitals  /80 (BP 1 Location: Left arm, BP Patient Position: At rest)   Pulse 66   Temp 97.9 °F (36.6 °C)   Resp 18   Ht 6' 3\" (1.905 m)   Wt 127 kg (280 lb)   SpO2 97%   BMI 35.00 kg/m²       Heart:   Regular rhythm      Lungs:  Are clear      Past Medical History: There are no active problems to display for this patient. Surgical History:   Past Surgical History:   Procedure Laterality Date    HX APPENDECTOMY  early 2000's    HX KNEE ARTHROSCOPY Left 2019    HX KNEE ARTHROSCOPY Right 2017    HX ORTHOPAEDIC Right 2011    elbow    HX OTHER SURGICAL  1981    lt facial plates, Jaw    HX SHOULDER ARTHROSCOPY Right 2011       Social History: Patient  reports that he has never smoked. He has never used smokeless tobacco. He reports current alcohol use. He reports that he does not use drugs. Family History:   Family History   Problem Relation Age of Onset    Heart Disease Mother     Cancer Mother     Heart Disease Father     Hypertension Father        Allergies: Reviewed per EMR  No Known Allergies    Medications:    No current facility-administered medications on file prior to encounter. Current Outpatient Medications on File Prior to Encounter   Medication Sig    benazepril (LOTENSIN) 10 mg tablet Take 10 mg by mouth daily.  chlorthalidone (HYGROTEN) 50 mg tablet Take 50 mg by mouth daily.  ibuprofen (MOTRIN) 200 mg tablet Take  by mouth as needed.  omeprazole (PRILOSEC) 20 mg capsule Take 20 mg by mouth daily.  testosterone enanthate 100 mg/0.5 mL atIn by SubCUTAneous route every fourteen (14) days. The surgery is planned for the left knee MM. The patient is here today for outpatient surgery. I have examined the patient, no changes are noted in the patient's medical status.  Necessity for the procedure/care is still present and the history and physical above is current. See the office notes for the full long term history of the problem. Please see the recent office notes for the musculoskeletal examination.     Signed By: Temitope uLx MD     January 24, 2020 7:50 AM

## 2020-01-24 NOTE — BRIEF OP NOTE
BRIEF OPERATIVE NOTE    Date of Procedure: 1/24/2020   Preoperative Diagnosis: Complex tear of medial meniscus, current injury, left knee, initial encounter [S82.908A]  Postoperative Diagnosis: Complex tear of medial meniscus, current injury, left knee    Procedure(s):  LEFT KNEE ARTHROSCOPY WITH MEDIAL MENISCECTOMY  Surgeon(s) and Role:     * Areli Pollock MD - Primary         Surgical Assistant:     Surgical Staff:  Circ-1: Kristie Marinub Tech-1: Que [place] Time In Time Out   Incision Start 0820    Incision Close 4341      Anesthesia: General   Estimated Blood Loss:   Specimens: * No specimens in log *   Findings:    Complications:   Implants: * No implants in log *
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
## 2020-01-24 NOTE — ANESTHESIA POSTPROCEDURE EVALUATION
Procedure(s):  LEFT KNEE ARTHROSCOPY WITH MEDIAL MENISCECTOMY.     general    Anesthesia Post Evaluation      Multimodal analgesia: multimodal analgesia used between 6 hours prior to anesthesia start to PACU discharge  Patient location during evaluation: PACU  Patient participation: complete - patient participated  Level of consciousness: awake and alert  Pain management: adequate  Airway patency: patent  Anesthetic complications: no  Cardiovascular status: acceptable  Respiratory status: acceptable  Hydration status: acceptable  Post anesthesia nausea and vomiting:  none      Vitals Value Taken Time   /84 1/24/2020  9:17 AM   Temp 36.5 °C (97.7 °F) 1/24/2020  8:45 AM   Pulse 63 1/24/2020  9:17 AM   Resp 14 1/24/2020  9:17 AM   SpO2 96 % 1/24/2020  9:17 AM

## 2021-04-29 NOTE — PERIOP NOTES
Dr Benjamin Harris notified of patients continued 8/10 knee pain. New orders received.  See [facility] declines

## 2022-09-09 ENCOUNTER — HOSPITAL ENCOUNTER (OUTPATIENT)
Dept: GENERAL RADIOLOGY | Age: 57
Discharge: HOME | End: 2022-09-12
Payer: COMMERCIAL

## 2022-09-09 DIAGNOSIS — T14.90XA INJURY: ICD-10-CM

## 2022-09-09 PROCEDURE — 73140 X-RAY EXAM OF FINGER(S): CPT

## 2023-01-13 ENCOUNTER — HOSPITAL ENCOUNTER (OUTPATIENT)
Dept: GENERAL RADIOLOGY | Age: 58
Discharge: HOME | End: 2023-01-16

## 2023-01-13 DIAGNOSIS — T14.90XA INJURY: ICD-10-CM

## 2023-01-13 PROCEDURE — 73140 X-RAY EXAM OF FINGER(S): CPT

## 2023-02-14 ENCOUNTER — HOSPITAL ENCOUNTER (OUTPATIENT)
Dept: PHYSICAL THERAPY | Age: 58
Setting detail: RECURRING SERIES
Discharge: HOME | End: 2023-02-17
Payer: COMMERCIAL

## 2023-02-14 PROCEDURE — 97161 PT EVAL LOW COMPLEX 20 MIN: CPT

## 2023-02-14 PROCEDURE — 97110 THERAPEUTIC EXERCISES: CPT

## 2023-02-14 NOTE — PLAN OF CARE
Jefferson Chou  : 1965  Primary: Generic Self-insured Wc (Worker's Comp)  Secondary:  [employer]  [address] Ang[address] [address]  Phone: [phone]  Fax: [phone]         PT Visit Info:       Visit Count:  Visit count could not be calculated. Make sure you are using a visit which is associated with an episode. OUTPATIENT PHYSICAL THERAPY:             OP NOTE TYPE: Initial Assessment 2023               Episode (No data found) Appt Desk         Treatment Diagnosis:  {Rehab Dx Codes:22216}  Medical/Referring Diagnosis:  No admission diagnoses are documented for this encounter. Referring Physician:  Debra Emery DO MD Orders:  PT Eval and Treat ***  Return MD Appt:  ***  Date of Onset:       Allergies:  Patient has no allergy information on record. Restrictions/Precautions:           Medications Last Reviewed:  2023     SUBJECTIVE   History of Injury/Illness (Reason for Referral):  Reports that he hurt his left thumb with a hammer in August. It was cut and had it wrapped at that time and bandaged with steristrips. December it started to hurt again and has been really sore to the touch and is very hard to bend. He keeps it wrapped at work. He has been icing it and running it under hot water which helps. He had 2 x-rays which were negative. He had a cortisone shot which didn't help. He is still able to do his job without his thumb. Patient Stated Goal(s):  \"***\"  Initial:      /10 Post Session:      /10  Past Medical History/Comorbidities:   Mr. Kelsey Servin  has a past medical history of Arrhythmia, Edema of extremities, GERD (gastroesophageal reflux disease), Head trauma, Hypertension, Low testosterone, Psychiatric disorder, PUD (peptic ulcer disease), and Seizures ([facility] [address] Mr. Kelsey Servin  has a past surgical history that includes Knee arthroscopy (Right, ); Knee arthroscopy (Left, );  Shoulder arthroscopy (Right, ); orthopedic surgery (Right, 2011); other surgical history (1981); and Appendectomy (early 2000's). Social History/Living Environment:         Prior Level of Function/Work/Activity:               Learning:         Fall Risk Scale:         {Additional Subjective Info (Optional):12000}      OBJECTIVE   {PT/OT Objective:63121}  ASSESSMENT   Initial Assessment:  ***  Problem List: (Impacting functional limitations): Therapy Prognosis:         Initial Assessment Complexity:      PLAN   Effective Dates: *** TO     Frequency/Duration:     Interventions Planned (Treatment may consist of any combination of the following):          Goals: (Goals have been discussed and agreed upon with patient.)  Short-Term Functional Goals: Time Frame: ***  ***  Discharge Goals: Time Frame: ***  ***         Outcome Measure:   {OUTCOME MEASURES:60426}    Medical Necessity:   {PT Medical Necessity:82442::\"> Skilled intervention continues to be required due to ***. \"}  Reason For Services/Other Comments:  {PT Continuation:43799::\"> Patient continues to require skilled intervention due to ***. \"}  Total Duration:       Regarding Jeffersonmiguel Chou's therapy, I certify that the treatment plan above will be carried out by a therapist or under their direction.   Thank you for this referral,  Christ Haney, PT     Referring Physician Signature: Tai Acosta, DO {MD Signature Line:52525}        Post Session Pain  Charge Capture  PT Visit Info MD Roosevelt Machado

## 2023-02-14 NOTE — PROGRESS NOTES
Jefferson Chou  : 1965  Primary: Generic Self-insured Wc (Worker's Comp)  Secondary:  [employer]  [address] Kim Fernandez [place] [address]  Phone: [phone]  Fax: [phone] No data recorded  No data recorded    PT Visit Info:       Visit Count:  Visit count could not be calculated. Make sure you are using a visit which is associated with an episode. OUTPATIENT PHYSICAL THERAPY:OP NOTE TYPE: Treatment Note 2023       Episode  }Appt Desk             Treatment Diagnosis:  {Rehab Dx GHAQ}  Medical/Referring Diagnosis:  No admission diagnoses are documented for this encounter. Referring Physician:  Karina Gomez DO MD Orders:  PT Eval and Treat ***  Date of Onset:  No data recorded   Allergies:   Patient has no allergy information on record. Restrictions/Precautions:  No data recorded  No data recorded   Interventions Planned (Treatment may consist of any combination of the following):    No data recorded     Subjective Comments:     Initial:}     /10Post Session:        /10  Medications Last Reviewed:  2023  Updated Objective Findings:  {New Findings:98840}  Treatment   {TREATMENTSOPPT:70619}  {Grids/Tables:45706}    Treatment/Session Summary:    Treatment Assessment:     Communication/Consultation:  {Communication:10243}  Equipment provided today:  {None/Wildcard:97546}  Recommendations/Intent for next treatment session: Next visit will focus on ***.     Total Treatment Billable Duration:  *** minutes       Brianne Clements, PT       Charge Capture  }Post Session Pain  PT Visit Info  [facility] Portal  MD Guidelines  Scanned Media  Benefits  MyChart    Future Appointments   Date Time Provider Radha Wilson   2023  1:45 PM Brianne Clements, PT [facility]

## 2023-02-20 ENCOUNTER — HOSPITAL ENCOUNTER (OUTPATIENT)
Dept: PHYSICAL THERAPY | Age: 58
Setting detail: RECURRING SERIES
Discharge: HOME | End: 2023-02-23
Payer: COMMERCIAL

## 2023-02-20 PROCEDURE — 97140 MANUAL THERAPY 1/> REGIONS: CPT

## 2023-02-20 PROCEDURE — 97110 THERAPEUTIC EXERCISES: CPT

## 2023-02-20 ASSESSMENT — PAIN SCALES - GENERAL: PAINLEVEL_OUTOF10: 6

## 2023-02-27 ENCOUNTER — HOSPITAL ENCOUNTER (OUTPATIENT)
Dept: PHYSICAL THERAPY | Age: 58
Setting detail: RECURRING SERIES
Discharge: HOME | End: 2023-03-02
Payer: COMMERCIAL

## 2023-02-27 PROCEDURE — 97140 MANUAL THERAPY 1/> REGIONS: CPT

## 2023-02-27 PROCEDURE — 97110 THERAPEUTIC EXERCISES: CPT

## 2023-02-27 PROCEDURE — 97035 APP MDLTY 1+ULTRASOUND EA 15: CPT

## 2023-02-27 ASSESSMENT — PAIN SCALES - GENERAL: PAINLEVEL_OUTOF10: 4

## 2023-02-27 NOTE — PROGRESS NOTES
Jefferson Chou  : 1965  Primary: Generic Self-insured Wc (Worker's Comp)  Secondary:  [employer]  [address] Dary Hurt [address] [place] [address]  Phone: [phone]  Fax: [phone] Plan Frequency: 1-2x/ wk  Plan of Care/Certification Expiration Date: 23      PT Visit Info:  Plan Frequency: 1-2x/ wk  Plan of Care/Certification Expiration Date: 23      Visit Count:  3    OUTPATIENT PHYSICAL THERAPY:OP NOTE TYPE: Treatment Note 2023       Episode  }Appt Desk             Treatment Diagnosis:  Pain in left finger(s) (M79.645)  Medical/Referring Diagnosis:  Pain in left finger(s) [H46.423]  Referring Physician:  Vidal Wills DO MD Orders:  PT Eval and Treat   Date of Onset:  Onset Date:  ()     Allergies:   Patient has no allergy information on record. Restrictions/Precautions:  No data recorded  No data recorded   Interventions Planned (Treatment may consist of any combination of the following):    Current Treatment Recommendations: Strengthening; ROM; Manual; Home exercise program; Return to work related activity; Pain management; Modalities; Dry needling     Subjective Comments:  Reports that he is doing some better. Continue to try to work it at home and work. Initial:}    4/10Post Session:       4/10  Medications Last Reviewed:  2023  Updated Objective Findings:  None Today  Treatment   THERAPEUTIC EXERCISE: (20 minutes):    Exercises per grid below to improve mobility.     Date:  23 Date:   Date  23   Activity/Exercise Parameters Parameters    Education Discussed pathology of trigger finger, HEP, plan of care  Reviewed HEP  Taped his finger to limit IP extension using kinesio tape   Thumb mobility Discussed working on mobility up to 5-10x/day Thumb active flexion, adduction, abduction 2 x 10    ube  With L handle built up / level 1 -   Thumb IP flex/ext  AROM x 10 Worked on gradual thumb IP flexion, stabilized MCP joint, added resistance of a ball and then manual resistance   Finger spread against rubber band resistance  X 10 -   Pronation/supination   Using flex bar, encouraged thumb flexion                 Manual Therapy (15 min):  - gentle passive ROM to L thumb  - gentle passive thumb distraction and mobilizations    Modalities: (done to improve mobility of the tendon)  Ultrasound, 8 min, (10 min w/ setup) intensity,  0.7,     Treatment/Session Summary:    Treatment Assessment:  Mr. Tish Salazar tolerated the session well. his DIP flexion is slightly improving. Taped to limit extension at that joint. Discussed to wear the tape for 24-48 hours as long as it is not bothering him. Communication/Consultation:  None today  Equipment provided today:  None  Recommendations/Intent for next treatment session: Next visit will focus on progressing his plan of car.     Total Treatment Billable Duration:  20 min therex, 15 min manual therapy, ultrasound 10   Time In: 0830  Time Out: 0915    Aruna Weinberg PT       Charge Capture  }Post Session Pain  PT Visit [facility] Portal  MD Guidelines  Scanned Media  Benefits  MyChart    Future Appointments   Date Time Provider Radha Wilson   3/1/2023  8:30 AM [facility]

## 2023-03-01 ENCOUNTER — HOSPITAL ENCOUNTER (OUTPATIENT)
Dept: PHYSICAL THERAPY | Age: 58
Setting detail: RECURRING SERIES
Discharge: HOME | End: 2023-03-04
Payer: COMMERCIAL

## 2023-03-01 PROCEDURE — 97140 MANUAL THERAPY 1/> REGIONS: CPT

## 2023-03-01 PROCEDURE — 97035 APP MDLTY 1+ULTRASOUND EA 15: CPT

## 2023-03-01 PROCEDURE — 97110 THERAPEUTIC EXERCISES: CPT

## 2023-03-01 ASSESSMENT — PAIN SCALES - GENERAL
PAINLEVEL_OUTOF10: 2
PAINLEVEL_OUTOF10: 2

## 2023-03-01 NOTE — PROGRESS NOTES
Jefferson Chou  : 1965  Primary: Generic Self-insured Wc (Worker's Comp)  Secondary:  [employer]  [address] Dary Hurt [address] [place] [address]  Phone: [phone]  Fax: [phone] Plan Frequency: 1-2x/ wk  Plan of Care/Certification Expiration Date: 23      PT Visit Info:  Plan Frequency: 1-2x/ wk  Plan of Care/Certification Expiration Date: 23      Visit Count:  4    OUTPATIENT PHYSICAL THERAPY:OP NOTE TYPE: Treatment Note 3/1/2023       Episode  }Appt Desk             Treatment Diagnosis:  Pain in left finger(s) (M79.645)  Medical/Referring Diagnosis:  Pain in left finger(s) [V52.781]  Referring Physician:  Vidal Wills DO MD Orders:  PT Eval and Treat   Date of Onset:  Onset Date:  ()     Allergies:   Patient has no allergy information on record. Restrictions/Precautions:  No data recorded  No data recorded   Interventions Planned (Treatment may consist of any combination of the following):    Current Treatment Recommendations: Strengthening; ROM; Manual; Home exercise program; Return to work related activity; Pain management; Modalities; Dry needling     Subjective Comments:  Reports that his thumb is sore but has more motion. Initial:}    2/10Post Session:       2/10  Medications Last Reviewed:  3/1/2023  Updated Objective Findings:   improved thumb IP mobility  Treatment   THERAPEUTIC EXERCISE: (15 minutes):    Exercises per grid below to improve mobility.     Date:  23 Date:   Date  23 Date  3-1-23   Activity/Exercise Parameters Parameters     Education Discussed pathology of trigger finger, HEP, plan of care  Reviewed HEP  Taped his finger to limit IP extension using kinesio tape Reviewed HEP  Taped his finger to limit IP extension using kinesio tape   Thumb mobility Discussed working on mobility up to 5-10x/day Thumb active flexion, adduction, abduction 2 x 10     ube  With L handle built up 4/4 level 1 -    Thumb IP flex/ext  AROM x 10 Worked on gradual thumb IP flexion, stabilized MCP joint, added resistance of a ball and then manual resistance Worked on gradual thumb IP flexion, stabilized MCP joint, added resistance w/ orange band, 10x, 2 sets   Finger spread against rubber band resistance  X 10 -    Pronation/supination   Using flex bar, encouraged thumb flexion -                   Manual Therapy (15 min):  - gentle passive ROM to L thumb IP joint  - soft tissue mobilization of the extensor thumb tendon and muscle belly    Modalities: (done to improve mobility of the tendon)  Ultrasound, 8 min, (10 min w/ setup) intensity,  0.7,     Treatment/Session Summary:    Treatment Assessment:  Mr. Florinda Bah has much better range at the IP joint but is still sore to end range motions. Continued to gradually facilitated ROM and active movements. Taped again to maintain slight flexion there. Communication/Consultation:  None today  Equipment provided today:  None  Recommendations/Intent for next treatment session: Next visit will focus on progressing his plan of car.     Total Treatment Billable Duration:  15 min therex, 15 min manual therapy, ultrasound 10   Time In: 0830  Time Out: 0910    Angela Dunham PT       Charge Capture  }Post Session Pain  PT Visit [facility] Portal  MD Guidelines  Scanned Media  Benefits  MyChart    Future Appointments   Date Time Provider Radha Wilson   3/6/2023  8:45 AM Katie Solo PT [facility]   3/8/2023  2:30 PM Katie Solo PT [facility]   3/13/2023  1:00 PM VANCE Thurman   3/17/2023  9:30 AM VANCE Thurman [facility]

## 2023-03-06 ENCOUNTER — HOSPITAL ENCOUNTER (OUTPATIENT)
Dept: PHYSICAL THERAPY | Age: 58
Setting detail: RECURRING SERIES
Discharge: HOME | End: 2023-03-09
Payer: COMMERCIAL

## 2023-03-06 PROCEDURE — 97035 APP MDLTY 1+ULTRASOUND EA 15: CPT

## 2023-03-06 PROCEDURE — 97110 THERAPEUTIC EXERCISES: CPT

## 2023-03-06 PROCEDURE — 97140 MANUAL THERAPY 1/> REGIONS: CPT

## 2023-03-06 NOTE — PROGRESS NOTES
Jefferson Chou  : 1965  Primary: Generic Self-insured Wc (Worker's Comp)  Secondary:  [employer]  [address] Robel breen [address] [place] [address]  Phone: [phone]  Fax: [phone] Plan Frequency: 1-2x/ wk  Plan of Care/Certification Expiration Date: 23      PT Visit Info:  Plan Frequency: 1-2x/ wk  Plan of Care/Certification Expiration Date: 23      Visit Count:  5    OUTPATIENT PHYSICAL THERAPY:OP NOTE TYPE: Treatment Note 3/6/2023       Episode  }Appt Desk             Treatment Diagnosis:  Pain in left finger(s) (M79.645)  Medical/Referring Diagnosis:  Pain in left finger(s) [I33.456]  Referring Physician:  Brian Doshi DO MD Orders:  PT Eval and Treat   Date of Onset:  Onset Date:  ()     Allergies:   Patient has no allergy information on record. Restrictions/Precautions:  No data recorded  No data recorded   Interventions Planned (Treatment may consist of any combination of the following):    Current Treatment Recommendations: Strengthening; ROM; Manual; Home exercise program; Return to work related activity; Pain management; Modalities; Dry needling     Subjective Comments:  pt reports improvements in thumb mobility  Initial:}     /10Post Session:        /10  Medications Last Reviewed:  3/6/2023  Updated Objective Findings:   improved thumb IP mobility  Treatment   THERAPEUTIC EXERCISE: (25 minutes):    Exercises per grid below to improve mobility.     Date:  23 Date:   Date  23 Date  3-1-23 3/6   Activity/Exercise Parameters Parameters      Education Discussed pathology of trigger finger, HEP, plan of care  Reviewed HEP  Taped his finger to limit IP extension using kinesio tape Reviewed HEP  Taped his finger to limit IP extension using kinesio tape Reviewed HEP   Thumb mobility Discussed working on mobility up to 5-10x/day Thumb active flexion, adduction, abduction 2 x 10   Thumb active flexion, adduction, abduction 2 x 10   ube  With L handle built up 4/4 level 1 -  4/4 level 1   Thumb IP flex/ext  AROM x 10 Worked on gradual thumb IP flexion, stabilized MCP joint, added resistance of a ball and then manual resistance Worked on gradual thumb IP flexion, stabilized MCP joint, added resistance w/ orange band, 10x, 2 sets Worked on gradual thumb IP flexion, stabilized MCP joint, added resistance w/ rubber band, 10x, 2 sets   Finger spread against rubber band resistance  X 10 -  X  10   Pronation/supination   Using flex bar, encouraged thumb flexion -    Pinch      Against towel x 10             Manual Therapy (15 min):  - gentle passive ROM to L thumb IP joint  - soft tissue mobilization of the extensor thumb tendon and muscle belly    Modalities: (done to improve mobility of the tendon)  Ultrasound, 10 min, (10 min w/ setup) intensity,  0.7,     Uncharged time (5 min):   - moist heat to thumb    Treatment/Session Summary:    Treatment Assessment:  increased ROM tolerated after ultrasound  Communication/Consultation:  None today  Equipment provided today:  None  Recommendations/Intent for next treatment session: Next visit will focus on progressing his plan of car. Total Treatment Billable Duration:  15 min therex, 15 min manual therapy, ultrasound 10   Time In: 0845  Time Out: [address] [facility].  VANCE Tadeo       Charge Capture  }Post Session Pain  PT Visit Info  [facility] Portal  MD Guidelines  Scanned Media  Benefits  MyChart    Future Appointments   Date Time Provider Radha Wilson   3/8/2023  2:30 PM Ariel Mills PT [facility]   3/13/2023  1:00 PM VANCE Byrne   3/17/2023  9:30 AM VANCE Byrne [facility]

## 2023-03-08 ENCOUNTER — HOSPITAL ENCOUNTER (OUTPATIENT)
Dept: PHYSICAL THERAPY | Age: 58
Setting detail: RECURRING SERIES
Discharge: HOME | End: 2023-03-11
Payer: COMMERCIAL

## 2023-03-08 PROCEDURE — 97140 MANUAL THERAPY 1/> REGIONS: CPT

## 2023-03-08 PROCEDURE — 97110 THERAPEUTIC EXERCISES: CPT

## 2023-03-08 PROCEDURE — 97035 APP MDLTY 1+ULTRASOUND EA 15: CPT

## 2023-03-08 NOTE — PROGRESS NOTES
Jefferson Chou  : 1965  Primary: Generic Self-insured Wc (Worker's Comp)  Secondary:  [employer]  [address] Nayan Dickey [address] [place] [address]  Phone: [phone]  Fax: [phone] Plan Frequency: 1-2x/ wk  Plan of Care/Certification Expiration Date: 23      PT Visit Info:  Plan Frequency: 1-2x/ wk  Plan of Care/Certification Expiration Date: 23      Visit Count:  6    OUTPATIENT PHYSICAL THERAPY:OP NOTE TYPE: Treatment Note 3/8/2023       Episode  }Appt Desk             Treatment Diagnosis:  Pain in left finger(s) (M79.645)  Medical/Referring Diagnosis:  Pain in left finger(s) [E71.842]  Referring Physician:  Carolynn Mitchell DO MD Orders:  PT Eval and Treat   Date of Onset:  Onset Date:  ()     Allergies:   Patient has no allergy information on record. Restrictions/Precautions:  No data recorded  No data recorded   Interventions Planned (Treatment may consist of any combination of the following):    Current Treatment Recommendations: Strengthening; ROM; Manual; Home exercise program; Return to work related activity; Pain management; Modalities; Dry needling     Subjective Comments:  pt has been using hand more recently. today thumb is more sore and clicks when it bends  Initial:}     /10Post Session:        /10  Medications Last Reviewed:  3/8/2023  Updated Objective Findings:   improved thumb IP mobility  Treatment   THERAPEUTIC EXERCISE: (25 minutes):    Exercises per grid below to improve mobility.     Date:  23 Date:   Date  23 Date  3-1-23 3/6 3/8   Activity/Exercise Parameters Parameters       Education Discussed pathology of trigger finger, HEP, plan of care  Reviewed HEP  Taped his finger to limit IP extension using kinesio tape Reviewed HEP  Taped his finger to limit IP extension using kinesio tape Reviewed HEP    Thumb mobility Discussed working on mobility up to 5-10x/day Thumb active flexion, adduction, abduction 2 x 10   Thumb active flexion, adduction, abduction 2 x 10 Thumb active flexion, adduction, abduction 2 x 10   ube  With L handle built up 4/4 level 1 -  4/4 level 1 4/4 level 3   Thumb IP flex/ext  AROM x 10 Worked on gradual thumb IP flexion, stabilized MCP joint, added resistance of a ball and then manual resistance Worked on gradual thumb IP flexion, stabilized MCP joint, added resistance w/ orange band, 10x, 2 sets Worked on gradual thumb IP flexion, stabilized MCP joint, added resistance w/ rubber band, 10x, 2 sets Worked on gradual thumb IP flexion, stabilized MCP joint, added resistance w/ rubber band, 10x, 2 sets   Finger spread against rubber band resistance  X 10 -  X  10 X 10   Pronation/supination   Using flex bar, encouraged thumb flexion -     Pinch      Against towel x 10 Against towel x 10              Manual Therapy (15 min):  - gentle passive ROM to L thumb IP joint  - soft tissue mobilization of the extensor thumb tendon and muscle belly    Modalities: (done to improve mobility of the tendon)  Ultrasound, 10 min, (10 min w/ setup) intensity,  1.0,     Uncharged time (5 min):   - moist heat to thumb    Treatment/Session Summary:    Treatment Assessment:  pt's thumb clicks with IP flexion movements, this was not the case last treatment. Communication/Consultation:  None today  Equipment provided today:  None  Recommendations/Intent for next treatment session: Next visit will focus on progressing his plan of car. Total Treatment Billable Duration:  25 min therex, 15 min manual therapy, ultrasound 10 min  Time In: 1430  Time Out: [address] [place] [address] Dat Tadeo PT       Charge Capture  }Post Session Pain  PT Visit Info  [facility] Portal  MD Guidelines  Scanned Media  Benefits  MyChart    Future Appointments   Date Time Provider Radha Wilson   3/13/2023  1:00 PM Britt Carver PT [facility]   3/17/2023  9:30 AM Britt Carver PT [facility]

## 2023-03-13 ENCOUNTER — HOSPITAL ENCOUNTER (OUTPATIENT)
Dept: PHYSICAL THERAPY | Age: 58
Setting detail: RECURRING SERIES
Discharge: HOME | End: 2023-03-16
Payer: COMMERCIAL

## 2023-03-13 PROCEDURE — G0283 ELEC STIM OTHER THAN WOUND: HCPCS

## 2023-03-13 PROCEDURE — 97140 MANUAL THERAPY 1/> REGIONS: CPT

## 2023-03-13 PROCEDURE — 97110 THERAPEUTIC EXERCISES: CPT

## 2023-03-14 NOTE — PROGRESS NOTES
Jefferson Chou  : 1965  Primary: Generic Self-insured Wc (Worker's Comp)  Secondary:  [employer]  [address] Kelsea Concepcion [address] [place] [address]  Phone: [phone]  Fax: [phone] Plan Frequency: 1-2x/ wk  Plan of Care/Certification Expiration Date: 23      PT Visit Info:  Plan Frequency: 1-2x/ wk  Plan of Care/Certification Expiration Date: 23      Visit Count:  7    OUTPATIENT PHYSICAL THERAPY:OP NOTE TYPE: Treatment Note 3/13/2023       Episode  }Appt Desk             Treatment Diagnosis:  Pain in left finger(s) (M79.645)  Medical/Referring Diagnosis:  Pain in left finger(s) [Y41.184]  Referring Physician:  Jesus Manuel Hendricks DO MD Orders:  PT Eval and Treat   Date of Onset:  Onset Date:  ()     Allergies:   Patient has no allergy information on record. Restrictions/Precautions:  No data recorded  No data recorded   Interventions Planned (Treatment may consist of any combination of the following):    Current Treatment Recommendations: Strengthening; ROM; Manual; Home exercise program; Return to work related activity; Pain management; Modalities; Dry needling     Subjective Comments:  pt reports thumb is moving more than it was 2 weeks ago, still some clicking and soreness  Initial:}     /10Post Session:        /10  Medications Last Reviewed:  3/13/2023  Updated Objective Findings:   improved thumb IP mobility  Treatment   THERAPEUTIC EXERCISE: (25 minutes):    Exercises per grid below to improve mobility.     Date:  23 Date:   Date  23 Date  3-1-23 3/6 3/8 3/13   Activity/Exercise Parameters Parameters        Education Discussed pathology of trigger finger, HEP, plan of care  Reviewed HEP  Taped his finger to limit IP extension using kinesio tape Reviewed HEP  Taped his finger to limit IP extension using kinesio tape Reviewed HEP     Thumb mobility Discussed working on mobility up to 5-10x/day Thumb active flexion, adduction, abduction 2 x 10 Thumb active flexion, adduction, abduction 2 x 10 Thumb active flexion, adduction, abduction 2 x 10 Thumb active flexion, adduction, abduction 2 x 10   ube  With L handle built up 4/4 level 1 -  4/4 level 1 4/4 level 3 4/4 level 3   Thumb IP flex/ext  AROM x 10 Worked on gradual thumb IP flexion, stabilized MCP joint, added resistance of a ball and then manual resistance Worked on gradual thumb IP flexion, stabilized MCP joint, added resistance w/ orange band, 10x, 2 sets Worked on gradual thumb IP flexion, stabilized MCP joint, added resistance w/ rubber band, 10x, 2 sets Worked on gradual thumb IP flexion, stabilized MCP joint, added resistance w/ rubber band, 10x, 2 sets Worked on gradual thumb IP flexion, stabilized MCP joint, added resistance w/ rubber band, 10x, 2 sets   Finger spread against rubber band resistance  X 10 -  X  10 X 10 X 10   Pronation/supination   Using flex bar, encouraged thumb flexion -      Pinch      Against towel x 10 Against towel x 10 Against towel x 10               Manual Therapy (15 min):  - gentle passive ROM to L thumb IP joint  - soft tissue mobilization of the extensor thumb tendon and muscle belly    Modalities: (done to improve mobility of the tendon)  Ultrasound, 10 min, (10 min w/ setup) intensity,  1.0, - NOT TODAY    Unattende estim (10 min):  - tens set up to L thumb, intensity adjusted to pt tolerance    Uncharged time (5 min):   - moist heat to thumb    Treatment/Session Summary:    Treatment Assessment:  pt tolerated more motion today than seen since starting PT  Communication/Consultation:  None today  Equipment provided today:  None  Recommendations/Intent for next treatment session: Next visit will focus on progressing his plan of car. Total Treatment Billable Duration:  25 min therex, 15 min manual therapy, unattended estim 10 min  Time In: 1300  Time Out: 250 [address]  Shwetha PT       Charge Capture  }Post Session Pain  PT Visit Info  [employer] Portal  MD Guidelines  Scanned Media  Benefits  MyChart    Future Appointments   Date Time Provider Department Center   3/17/2023  9:30 AM Nithin Tadeo, PT [facility]   3/20/2023  8:00 AM Nithinfarnaz Tadeo, PT [facility]   3/24/2023  8:00 AM Nithin H Shwetha, PT [facility]   3/27/2023 10:15 AM Nithin H Shwetha, PT [facility]   3/29/2023 10:15 AM Nithin Tadeo, PT [facility]

## 2023-03-17 ENCOUNTER — HOSPITAL ENCOUNTER (OUTPATIENT)
Dept: PHYSICAL THERAPY | Age: 58
Setting detail: RECURRING SERIES
Discharge: HOME | End: 2023-03-20
Payer: COMMERCIAL

## 2023-03-17 PROCEDURE — G0283 ELEC STIM OTHER THAN WOUND: HCPCS

## 2023-03-17 PROCEDURE — 97140 MANUAL THERAPY 1/> REGIONS: CPT

## 2023-03-17 PROCEDURE — 97110 THERAPEUTIC EXERCISES: CPT

## 2023-03-20 ENCOUNTER — HOSPITAL ENCOUNTER (OUTPATIENT)
Dept: PHYSICAL THERAPY | Age: 58
Setting detail: RECURRING SERIES
Discharge: HOME | End: 2023-03-23
Payer: COMMERCIAL

## 2023-03-20 PROCEDURE — G0283 ELEC STIM OTHER THAN WOUND: HCPCS

## 2023-03-20 PROCEDURE — 97110 THERAPEUTIC EXERCISES: CPT

## 2023-03-20 PROCEDURE — 97140 MANUAL THERAPY 1/> REGIONS: CPT

## 2023-03-20 NOTE — PROGRESS NOTES
Jefferson Chou  : 1965  Primary: Generic Self-insured Wc (Worker's Comp)  Secondary:  [employer]  [address] [place] [address]  Phone: [phone]  Fax: [phone] Plan Frequency: 1-2x/ wk  Plan of Care/Certification Expiration Date: 23      PT Visit Info:  Plan Frequency: 1-2x/ wk  Plan of Care/Certification Expiration Date: 23      Visit Count:  9    OUTPATIENT PHYSICAL THERAPY:OP NOTE TYPE: Treatment Note 3/20/2023       Episode  }Appt Desk             Treatment Diagnosis:  Pain in left finger(s) (M79.645)  Medical/Referring Diagnosis:  Pain in left finger(s) [Q50.161]  Referring Physician:  Libia Baig DO MD Orders:  PT Eval and Treat   Date of Onset:  Onset Date:  ()     Allergies:   Patient has no allergy information on record. Restrictions/Precautions:  No data recorded  No data recorded   Interventions Planned (Treatment may consist of any combination of the following):    Current Treatment Recommendations: Strengthening; ROM; Manual; Home exercise program; Return to work related activity; Pain management; Modalities; Dry needling     Subjective Comments:  pt reports less soreness and thumb moving better than before  Initial:}     10Post Session:        /10  Medications Last Reviewed:  3/20/2023  Updated Objective Findings:   improved thumb IP mobility  Treatment   THERAPEUTIC EXERCISE: (25 minutes):    Exercises per grid below to improve mobility.     3/13 3/17 3/20   Activity/Exercise      Education      Thumb mobility Thumb active flexion, adduction, abduction 2 x 10 Thumb active flexion, adduction, abduction 2 x 10 Thumb active flexion, adduction, abduction 2 x 10   ube 4/4 level 3 4/4 level 3 4/4 level 3   Thumb IP flex/ext Worked on gradual thumb IP flexion, stabilized MCP joint, added resistance w/ rubber band, 10x, 2 sets Worked on gradual thumb IP flexion, stabilized MCP joint, added resistance w/ rubber band, 10x, 2 sets

## 2023-03-24 ENCOUNTER — HOSPITAL ENCOUNTER (OUTPATIENT)
Dept: PHYSICAL THERAPY | Age: 58
Setting detail: RECURRING SERIES
Discharge: HOME | End: 2023-03-27
Payer: COMMERCIAL

## 2023-03-24 PROCEDURE — G0283 ELEC STIM OTHER THAN WOUND: HCPCS

## 2023-03-24 PROCEDURE — 97140 MANUAL THERAPY 1/> REGIONS: CPT

## 2023-03-24 PROCEDURE — 97110 THERAPEUTIC EXERCISES: CPT

## 2023-03-24 NOTE — PROGRESS NOTES
Jefferson Chou  : 1965  Primary: Generic Self-insured Wc (Worker's Comp)  Secondary:  [employer]  [address] [place] [id number]  Phone: [phone]  Fax: [phone] Plan Frequency: 1-2x/ wk  Plan of Care/Certification Expiration Date: 23      PT Visit Info:  Plan Frequency: 1-2x/ wk  Plan of Care/Certification Expiration Date: 23      Visit Count:  10    OUTPATIENT PHYSICAL THERAPY:OP NOTE TYPE: Treatment Note 3/24/2023       Episode  }Appt Desk             Treatment Diagnosis:  Pain in left finger(s) (M79.645)  Medical/Referring Diagnosis:  Pain in left finger(s) [Z97.718]  Referring Physician:  Mary Ann Benson DO MD Orders:  PT Eval and Treat   Date of Onset:  Onset Date:  ()     Allergies:   Patient has no allergy information on record. Restrictions/Precautions:  No data recorded  No data recorded   Interventions Planned (Treatment may consist of any combination of the following):    Current Treatment Recommendations: Strengthening; ROM; Manual; Home exercise program; Return to work related activity; Pain management; Modalities; Dry needling     Subjective Comments:  pt reports improvements in thumb mobility recently, less clicking  Initial:}     10Post Session:        /10  Medications Last Reviewed:  3/24/2023  Updated Objective Findings:   improved thumb IP mobility  Treatment   THERAPEUTIC EXERCISE: (25 minutes):    Exercises per grid below to improve mobility.     3/13 3/17 3/20 3/24   Activity/Exercise       Education       Thumb mobility Thumb active flexion, adduction, abduction 2 x 10 Thumb active flexion, adduction, abduction 2 x 10 Thumb active flexion, adduction, abduction 2 x 10 Thumb active flexion, adduction, abduction 2 x 10   ube 4/4 level 3 4/4 level 3 4/4 level 3 4/4 level 3   Thumb IP flex/ext Worked on gradual thumb IP flexion, stabilized MCP joint, added resistance w/ rubber band, 10x, 2 sets Worked on gradual thumb IP

## 2023-03-27 ENCOUNTER — HOSPITAL ENCOUNTER (OUTPATIENT)
Dept: PHYSICAL THERAPY | Age: 58
Setting detail: RECURRING SERIES
Discharge: HOME | End: 2023-03-30
Payer: COMMERCIAL

## 2023-03-27 PROCEDURE — 97110 THERAPEUTIC EXERCISES: CPT

## 2023-03-27 PROCEDURE — 97140 MANUAL THERAPY 1/> REGIONS: CPT

## 2023-03-27 PROCEDURE — G0283 ELEC STIM OTHER THAN WOUND: HCPCS

## 2023-03-27 NOTE — PROGRESS NOTES
Jefferson Chou  : 1965  Primary: Generic Self-insured Wc (Worker's Comp)  Secondary:  [employer]  [address] [place] [address]  Phone: [phone]  Fax: [phone] Plan Frequency: 1-2x/ wk  Plan of Care/Certification Expiration Date: 23      PT Visit Info:  Plan Frequency: 1-2x/ wk  Plan of Care/Certification Expiration Date: 23      Visit Count:  11    OUTPATIENT PHYSICAL THERAPY:OP NOTE TYPE: Treatment Note 3/27/2023       Episode  }Appt Desk             Treatment Diagnosis:  Pain in left finger(s) (M79.645)  Medical/Referring Diagnosis:  Pain in left finger(s) [E72.044]  Referring Physician:  Jamie Cruz DO MD Orders:  PT Eval and Treat   Date of Onset:  Onset Date:  ()     Allergies:   Patient has no allergy information on record. Restrictions/Precautions:  No data recorded  No data recorded   Interventions Planned (Treatment may consist of any combination of the following):    Current Treatment Recommendations: Strengthening; ROM; Manual; Home exercise program; Return to work related activity; Pain management; Modalities; Dry needling     Subjective Comments:  still soreness and pain at base of thumb. but continues to move much better than before  Initial:}     /10Post Session:        /10  Medications Last Reviewed:  3/27/2023  Updated Objective Findings:   improved thumb IP mobility  Treatment   THERAPEUTIC EXERCISE: (25 minutes):    Exercises per grid below to improve mobility.     3/13 3/17 3/20 3/24 3/17   Activity/Exercise        Education        Thumb mobility Thumb active flexion, adduction, abduction 2 x 10 Thumb active flexion, adduction, abduction 2 x 10 Thumb active flexion, adduction, abduction 2 x 10 Thumb active flexion, adduction, abduction 2 x 10 Thumb active flexion, adduction, abduction 2 x 10   ube 4/4 level 3 4/4 level 3 4/4 level 3 4/4 level 3 4/4 level 3   Thumb IP flex/ext Worked on gradual thumb IP flexion,

## 2023-03-29 ENCOUNTER — HOSPITAL ENCOUNTER (OUTPATIENT)
Dept: PHYSICAL THERAPY | Age: 58
Setting detail: RECURRING SERIES
Discharge: HOME | End: 2023-04-01
Payer: COMMERCIAL

## 2023-03-29 PROCEDURE — G0283 ELEC STIM OTHER THAN WOUND: HCPCS

## 2023-03-29 PROCEDURE — 97140 MANUAL THERAPY 1/> REGIONS: CPT

## 2023-03-29 PROCEDURE — 97110 THERAPEUTIC EXERCISES: CPT

## 2023-03-29 NOTE — PROGRESS NOTES
Jefferson Chou  : 1965  Primary: Generic Self-insured Wc (Worker's Comp)  Secondary:  [employer]  [address] [address]  Phone: [phone]  Fax: [phone] Plan Frequency: 1-2x/ wk  Plan of Care/Certification Expiration Date: 23      PT Visit Info:  Plan Frequency: 1-2x/ wk  Plan of Care/Certification Expiration Date: 23      Visit Count:  12    OUTPATIENT PHYSICAL THERAPY:OP NOTE TYPE: Treatment Note 3/29/2023       Episode  }Appt Desk             Treatment Diagnosis:  Pain in left finger(s) (M79.645)  Medical/Referring Diagnosis:  Pain in left finger(s) [C15.790]  Referring Physician:  Vita Chanel DO MD Orders:  PT Eval and Treat   Date of Onset:  Onset Date:  ()     Allergies:   Patient has no allergy information on record. Restrictions/Precautions:  No data recorded  No data recorded   Interventions Planned (Treatment may consist of any combination of the following):    Current Treatment Recommendations: Strengthening; ROM; Manual; Home exercise program; Return to work related activity; Pain management; Modalities; Dry needling     Subjective Comments:  pt reports cont soreness in joints, but less clicking and more movement  Initial:}     /10Post Session:        /10  Medications Last Reviewed:  3/29/2023  Updated Objective Findings:   improved thumb IP mobility  Treatment   THERAPEUTIC EXERCISE: (25 minutes):    Exercises per grid below to improve mobility.     3/13 3/17 3/20 3/24 3/17 3/29   Activity/Exercise         Education         Thumb mobility Thumb active flexion, adduction, abduction 2 x 10 Thumb active flexion, adduction, abduction 2 x 10 Thumb active flexion, adduction, abduction 2 x 10 Thumb active flexion, adduction, abduction 2 x 10 Thumb active flexion, adduction, abduction 2 x 10 Thumb active flexion, adduction, abduction 2 x 10   ube 4/4 level 3 4/4 level 3 4/4 level 3 4/4 level 3 4/4 level 3 4/ level 3   Thumb

## 2023-11-02 ENCOUNTER — OFFICE VISIT (OUTPATIENT)
Age: 58
End: 2023-11-02

## 2023-11-02 VITALS
HEIGHT: 77 IN | RESPIRATION RATE: 24 BRPM | OXYGEN SATURATION: 97 % | SYSTOLIC BLOOD PRESSURE: 150 MMHG | DIASTOLIC BLOOD PRESSURE: 86 MMHG | BODY MASS INDEX: 32.47 KG/M2 | WEIGHT: 275 LBS | HEART RATE: 64 BPM

## 2023-11-02 DIAGNOSIS — R06.02 SOB (SHORTNESS OF BREATH) ON EXERTION: Primary | ICD-10-CM

## 2023-11-02 ASSESSMENT — ENCOUNTER SYMPTOMS
SPUTUM PRODUCTION: 0
SHORTNESS OF BREATH: 1
WHEEZING: 1
ABDOMINAL PAIN: 0
NAUSEA: 0
EYE PAIN: 0
SINUS PRESSURE: 0
CHEST TIGHTNESS: 0
RHINORRHEA: 0
EYES NEGATIVE: 1
VOMITING: 0
COUGH: 0

## 2023-11-02 NOTE — PROGRESS NOTES
Received pt ambulating, alert and oriented x4 accompanied by \" Anny\" from [address] Pt with c/o feeling faint when exiting his vehicle this morning, shortness of breath, and a \" bad headache.' Pt directed by this nurse to exam room and positioned supine on exam table with head of bed elevated . NP present , directions followed. This nurse called pt's wife J Carlos Quiros at 745am to make aware of above findings and location of transfer , [facility] .

## 2023-11-02 NOTE — PROGRESS NOTES
PROGRESS NOTE    SUBJECTIVE:   Jefferson Butler is a 60 y.o. male seen for SOB with exertion, near syncopal episode, and tachypnea. Pt given 364 ASA on arrival. +2 edema noted to bilateral ankles. Pt has not been on his diuretic for the past 2 weeks. Pt denies chest pain, nausea, or blurred vision. EMS called and transported to [facility].    Chief Complaint    Headache         Shortness of Breath  This is a new problem. The current episode started today. The problem has been gradually worsening. Associated symptoms include headaches, leg pain, leg swelling and wheezing. Pertinent negatives include no abdominal pain, chest pain, fever, neck pain, rhinorrhea, sputum production, syncope or vomiting. The symptoms are aggravated by exercise. Current Outpatient Medications   Medication Sig Dispense Refill    aspirin-acetaminophen-caffeine (EXCEDRIN MIGRAINE) 250-250-65 MG per tablet Take 1 tablet by mouth as needed      benazepril (LOTENSIN) 10 MG tablet Take 10 mg by mouth daily      chlorthalidone (HYGROTEN) 50 MG tablet Take 50 mg by mouth daily      ibuprofen (ADVIL;MOTRIN) 200 MG tablet Take by mouth as needed      omeprazole (PRILOSEC) 20 MG delayed release capsule Take 20 mg by mouth daily      Testosterone Enanthate 100 MG/0.5ML SOAJ Inject into the skin every 14 days       No current facility-administered medications for this visit. Allergies   Allergen Reactions    Seasonal        Social History     Tobacco Use    Smoking status: Never    Smokeless tobacco: Never   Substance Use Topics    Alcohol use: Yes    Drug use: Never        Review of Systems   Constitutional:  Positive for activity change and fatigue. Negative for fever. HENT:  Negative for hearing loss, nosebleeds, rhinorrhea and sinus pressure. Eyes: Negative. Negative for pain. Respiratory:  Positive for shortness of breath and wheezing. Negative for cough, sputum production and chest tightness.     Cardiovascular:  Positive

## 2023-11-07 ENCOUNTER — TELEPHONE (OUTPATIENT)
Age: 58
End: 2023-11-07

## 2025-04-02 ENCOUNTER — TRANSCRIBE ORDERS (OUTPATIENT)
Dept: SCHEDULING | Age: 60
End: 2025-04-02

## 2025-04-02 DIAGNOSIS — C20 MALIGNANT NEOPLASM OF RECTUM (HCC): Primary | ICD-10-CM

## 2025-04-12 ENCOUNTER — HOSPITAL ENCOUNTER (OUTPATIENT)
Dept: MRI IMAGING | Age: 60
Discharge: HOME | End: 2025-04-15
Attending: RADIOLOGY
Payer: COMMERCIAL

## 2025-04-12 DIAGNOSIS — C20 MALIGNANT NEOPLASM OF RECTUM (HCC): ICD-10-CM

## 2025-04-12 PROCEDURE — 74183 MRI ABD W/O CNTR FLWD CNTR: CPT

## 2025-04-12 PROCEDURE — A9579 GAD-BASE MR CONTRAST NOS,1ML: HCPCS | Performed by: RADIOLOGY

## 2025-04-12 PROCEDURE — 6360000004 HC RX CONTRAST MEDICATION: Performed by: RADIOLOGY

## 2025-04-12 RX ADMIN — GADOTERIDOL 24 ML: 279.3 INJECTION, SOLUTION INTRAVENOUS at 13:23

## (undated) DEVICE — ZIMMER® STERILE DISPOSABLE TOURNIQUET CUFF WITH PROTECTIVE SLEEVE, DUAL PORT, SINGLE BLADDER, 34 IN. (86 CM)

## (undated) DEVICE — KNEE ARTHROSCOPY DR PAYLOR: Brand: MEDLINE INDUSTRIES, INC.

## (undated) DEVICE — STERILE HOOK LOCK LATEX FREE ELASTIC BANDAGE 6INX5YD: Brand: HOOK LOCK™

## (undated) DEVICE — INTENDED FOR TISSUE SEPARATION, AND OTHER PROCEDURES THAT REQUIRE A SHARP SURGICAL BLADE TO PUNCTURE OR CUT.: Brand: BARD-PARKER ® STAINLESS STEEL BLADES

## (undated) DEVICE — SET IRRIG L94IN DIA0.281IN L BOR W/ 2 N VENT SPIK 2 ON/OFF

## (undated) DEVICE — AMD ANTIMICROBIAL GAUZE SPONGES,12 PLY USP TYPE VII, 0.2% POLYHEXAMETHYLENE BIGUANIDE HCI (PHMB): Brand: CURITY

## (undated) DEVICE — STOCKINETTE,IMPERVIOUS,12X48,STERILE: Brand: MEDLINE

## (undated) DEVICE — SYR LR LCK 1ML GRAD NSAF 30ML --

## (undated) DEVICE — WEREWOLF FLOW 50 COBLATION WAND: Brand: COBLATION

## (undated) DEVICE — Device

## (undated) DEVICE — SET IRRIG DST FLX M CONN

## (undated) DEVICE — SOLUTION IRRIG 3000ML 0.9% SOD CHL FLX CONT 0797208] ICU MEDICAL INC]

## (undated) DEVICE — SURGICAL PROCEDURE PACK BASIC ST FRANCIS

## (undated) DEVICE — MASTISOL ADHESIVE LIQ 2/3ML

## (undated) DEVICE — SET IRRIG W 96IN TBNG 4 LN FLX BG

## (undated) DEVICE — T-DRAPE,EXTREMITY,STERILE: Brand: MEDLINE

## (undated) DEVICE — PADDING CAST W4INXL4YD ST COT COHESIVE HND TEARABLE SPEC

## (undated) DEVICE — BLADE SHV 4.0MM TOMCAT --

## (undated) DEVICE — (D)STRIP SKN CLSR 0.5X4IN WHT --

## (undated) DEVICE — STRIP,CLOSURE,WOUND,MEDI-STRIP,1/2X4: Brand: MEDLINE

## (undated) DEVICE — AMD ANTIMICROBIAL BANDAGE ROLL,6 PLY: Brand: KERLIX

## (undated) DEVICE — NDL SPNE QNCKE 18GX3.5IN LF --

## (undated) DEVICE — PADDING CAST W6INXL4YD ST COT COHESIVE HND TEARABLE SPEC

## (undated) DEVICE — BANDAGE COMPR SELF ADH 5 YDX6 IN TAN STRL PREMIERPRO LF

## (undated) DEVICE — (D)PREP SKN CHLRAPRP APPL 26ML -- CONVERT TO ITEM 371833

## (undated) DEVICE — BANDAGE COBAN 6 IN WND 6INX5YD FOAM